# Patient Record
Sex: MALE | Race: WHITE
[De-identification: names, ages, dates, MRNs, and addresses within clinical notes are randomized per-mention and may not be internally consistent; named-entity substitution may affect disease eponyms.]

---

## 2020-07-18 ENCOUNTER — HOSPITAL ENCOUNTER (EMERGENCY)
Dept: HOSPITAL 12 - ER | Age: 65
Discharge: ANOTHER HOSPITAL | End: 2020-07-18
Payer: COMMERCIAL

## 2020-07-18 VITALS — HEIGHT: 65 IN | BODY MASS INDEX: 26.66 KG/M2 | WEIGHT: 160 LBS

## 2020-07-18 DIAGNOSIS — Z82.49: ICD-10-CM

## 2020-07-18 DIAGNOSIS — R00.1: ICD-10-CM

## 2020-07-18 DIAGNOSIS — I10: ICD-10-CM

## 2020-07-18 DIAGNOSIS — Z86.73: ICD-10-CM

## 2020-07-18 DIAGNOSIS — I44.0: ICD-10-CM

## 2020-07-18 DIAGNOSIS — Z79.82: ICD-10-CM

## 2020-07-18 DIAGNOSIS — E11.9: ICD-10-CM

## 2020-07-18 DIAGNOSIS — Z79.84: ICD-10-CM

## 2020-07-18 DIAGNOSIS — J45.909: ICD-10-CM

## 2020-07-18 DIAGNOSIS — R41.82: Primary | ICD-10-CM

## 2020-07-18 LAB
ALP SERPL-CCNC: 51 U/L (ref 50–136)
ALT SERPL W/O P-5'-P-CCNC: 27 U/L (ref 16–63)
AST SERPL-CCNC: 18 U/L (ref 15–37)
BASOPHILS # BLD AUTO: 0.1 K/UL (ref 0–8)
BASOPHILS NFR BLD AUTO: 0.9 % (ref 0–2)
BILIRUB DIRECT SERPL-MCNC: 0.1 MG/DL (ref 0–0.2)
BILIRUB SERPL-MCNC: 0.4 MG/DL (ref 0.2–1)
BUN SERPL-MCNC: 17 MG/DL (ref 7–18)
CHLORIDE SERPL-SCNC: 106 MMOL/L (ref 98–107)
CO2 SERPL-SCNC: 29 MMOL/L (ref 21–32)
CREAT SERPL-MCNC: 1 MG/DL (ref 0.6–1.3)
EOSINOPHIL # BLD AUTO: 0.2 K/UL (ref 0–0.7)
EOSINOPHIL NFR BLD AUTO: 2.3 % (ref 0–7)
GLUCOSE SERPL-MCNC: 141 MG/DL (ref 74–106)
HCT VFR BLD AUTO: 41.4 % (ref 36.7–47.1)
HGB BLD-MCNC: 12.8 G/DL (ref 12.5–16.3)
LYMPHOCYTES # BLD AUTO: 2.9 K/UL (ref 20–40)
LYMPHOCYTES NFR BLD AUTO: 35.7 % (ref 20.5–51.5)
MCH RBC QN AUTO: 22 UUG (ref 23.8–33.4)
MCHC RBC AUTO-ENTMCNC: 31 G/DL (ref 32.5–36.3)
MCV RBC AUTO: 71.1 FL (ref 73–96.2)
MONOCYTES # BLD AUTO: 0.5 K/UL (ref 2–10)
MONOCYTES NFR BLD AUTO: 6.3 % (ref 0–11)
NEUTROPHILS # BLD AUTO: 4.5 K/UL (ref 1.8–8.9)
NEUTROPHILS NFR BLD AUTO: 54.8 % (ref 38.5–71.5)
PLATELET # BLD AUTO: 224 K/UL (ref 152–348)
POTASSIUM SERPL-SCNC: 4.7 MMOL/L (ref 3.5–5.1)
RBC # BLD AUTO: 5.82 MIL/UL (ref 4.06–5.63)
WBC # BLD AUTO: 8.3 K/UL (ref 3.6–10.2)
WS STN SPEC: 6.8 G/DL (ref 6.4–8.2)

## 2020-07-18 PROCEDURE — 99285 EMERGENCY DEPT VISIT HI MDM: CPT

## 2020-07-18 PROCEDURE — 80048 BASIC METABOLIC PNL TOTAL CA: CPT

## 2020-07-18 PROCEDURE — 80076 HEPATIC FUNCTION PANEL: CPT

## 2020-07-18 PROCEDURE — 70450 CT HEAD/BRAIN W/O DYE: CPT

## 2020-07-18 PROCEDURE — 85025 COMPLETE CBC W/AUTO DIFF WBC: CPT

## 2020-07-18 PROCEDURE — 83735 ASSAY OF MAGNESIUM: CPT

## 2020-07-18 PROCEDURE — A4663 DIALYSIS BLOOD PRESSURE CUFF: HCPCS

## 2020-07-18 PROCEDURE — 96361 HYDRATE IV INFUSION ADD-ON: CPT

## 2020-07-18 PROCEDURE — 83605 ASSAY OF LACTIC ACID: CPT

## 2020-07-18 PROCEDURE — 93005 ELECTROCARDIOGRAM TRACING: CPT

## 2020-07-18 PROCEDURE — 83880 ASSAY OF NATRIURETIC PEPTIDE: CPT

## 2020-07-18 PROCEDURE — 71045 X-RAY EXAM CHEST 1 VIEW: CPT

## 2020-07-18 PROCEDURE — 84484 ASSAY OF TROPONIN QUANT: CPT

## 2020-07-18 PROCEDURE — 36415 COLL VENOUS BLD VENIPUNCTURE: CPT

## 2020-07-18 PROCEDURE — 96374 THER/PROPH/DIAG INJ IV PUSH: CPT

## 2020-07-18 PROCEDURE — 87426 SARSCOV CORONAVIRUS AG IA: CPT

## 2020-07-18 RX ADMIN — SODIUM CHLORIDE ONE ML: 0.9 INJECTION, SOLUTION INTRAVENOUS at 10:15

## 2020-07-18 RX ADMIN — SODIUM CHLORIDE ONE MG: 9 INJECTION, SOLUTION INTRAVENOUS at 11:32

## 2020-07-18 RX ADMIN — ASPIRIN ONE MG: 81 TABLET, CHEWABLE ORAL at 11:32

## 2020-07-18 RX ADMIN — METFORMIN HYDROCHLORIDE ONE MG: 500 TABLET ORAL at 16:24

## 2020-07-18 NOTE — NUR
PT WAS TRANSFERED TO [facility] VIA ALS ANBULANCE. REPORT WAS GIVEN TO 
AMBULANCE RN.

## 2020-07-18 NOTE — NUR
PT IS GOING TO BE TRANSFERED VIA ALS AMBULANCE TO [facility], ROOM #523A. 
ADMITTING MD IS DR MAXWELL. REPORT WAS GIVEN TO NELSON RUVALCABA (). ALS 
AMBULANCE MURALI IS 1730. PT IS RESTING COMFOTRTABLY IN BED, NO S/S OF ACUTE 
DISTRESS. CONTINUE TO MONITOR THE PT.

## 2020-12-11 ENCOUNTER — HOSPITAL ENCOUNTER (INPATIENT)
Dept: HOSPITAL 12 - ER | Age: 65
LOS: 1 days | Discharge: HOME | DRG: 312 | End: 2020-12-12
Payer: COMMERCIAL

## 2020-12-11 VITALS — BODY MASS INDEX: 25.43 KG/M2 | WEIGHT: 162 LBS | HEIGHT: 67 IN

## 2020-12-11 VITALS — DIASTOLIC BLOOD PRESSURE: 74 MMHG | SYSTOLIC BLOOD PRESSURE: 164 MMHG

## 2020-12-11 DIAGNOSIS — I10: ICD-10-CM

## 2020-12-11 DIAGNOSIS — J45.909: ICD-10-CM

## 2020-12-11 DIAGNOSIS — E86.0: ICD-10-CM

## 2020-12-11 DIAGNOSIS — E11.9: ICD-10-CM

## 2020-12-11 DIAGNOSIS — N40.0: ICD-10-CM

## 2020-12-11 DIAGNOSIS — E78.5: ICD-10-CM

## 2020-12-11 DIAGNOSIS — Z86.73: ICD-10-CM

## 2020-12-11 DIAGNOSIS — Z79.84: ICD-10-CM

## 2020-12-11 DIAGNOSIS — R55: Primary | ICD-10-CM

## 2020-12-11 LAB
ALP SERPL-CCNC: 40 U/L (ref 50–136)
ALT SERPL W/O P-5'-P-CCNC: 20 U/L (ref 16–63)
AST SERPL-CCNC: 17 U/L (ref 15–37)
BASOPHILS # BLD AUTO: 0.1 K/UL (ref 0–8)
BASOPHILS NFR BLD AUTO: 0.8 % (ref 0–2)
BILIRUB DIRECT SERPL-MCNC: 0.1 MG/DL (ref 0–0.2)
BILIRUB SERPL-MCNC: 0.5 MG/DL (ref 0.2–1)
BUN SERPL-MCNC: 17 MG/DL (ref 7–18)
CHLORIDE SERPL-SCNC: 103 MMOL/L (ref 98–107)
CO2 SERPL-SCNC: 27 MMOL/L (ref 21–32)
CREAT SERPL-MCNC: 0.9 MG/DL (ref 0.6–1.3)
EOSINOPHIL # BLD AUTO: 0.3 K/UL (ref 0–0.7)
EOSINOPHIL NFR BLD AUTO: 2.8 % (ref 0–7)
GLUCOSE SERPL-MCNC: 140 MG/DL (ref 74–106)
HCT VFR BLD AUTO: 43.3 % (ref 36.7–47.1)
HGB BLD-MCNC: 13.7 G/DL (ref 12.5–16.3)
LYMPHOCYTES # BLD AUTO: 3.8 K/UL (ref 20–40)
LYMPHOCYTES NFR BLD AUTO: 40.8 % (ref 20.5–51.5)
MCH RBC QN AUTO: 22.5 UUG (ref 23.8–33.4)
MCHC RBC AUTO-ENTMCNC: 32 G/DL (ref 32.5–36.3)
MCV RBC AUTO: 71.3 FL (ref 73–96.2)
MONOCYTES # BLD AUTO: 0.6 K/UL (ref 2–10)
MONOCYTES NFR BLD AUTO: 6.6 % (ref 0–11)
NEUTROPHILS # BLD AUTO: 4.6 K/UL (ref 1.8–8.9)
NEUTROPHILS NFR BLD AUTO: 49 % (ref 38.5–71.5)
PLATELET # BLD AUTO: 220 K/UL (ref 152–348)
POTASSIUM SERPL-SCNC: 3.8 MMOL/L (ref 3.5–5.1)
RBC # BLD AUTO: 6.07 MIL/UL (ref 4.06–5.63)
WBC # BLD AUTO: 9.3 K/UL (ref 3.6–10.2)
WS STN SPEC: 7.1 G/DL (ref 6.4–8.2)

## 2020-12-11 PROCEDURE — A4663 DIALYSIS BLOOD PRESSURE CUFF: HCPCS

## 2020-12-11 PROCEDURE — G0378 HOSPITAL OBSERVATION PER HR: HCPCS

## 2020-12-11 RX ADMIN — LOSARTAN POTASSIUM SCH MG: 50 TABLET, FILM COATED ORAL at 17:00

## 2020-12-11 RX ADMIN — HYDROCHLOROTHIAZIDE SCH MG: 25 TABLET ORAL at 15:15

## 2020-12-11 RX ADMIN — METFORMIN HYDROCHLORIDE SCH MG: 500 TABLET ORAL at 17:00

## 2020-12-11 RX ADMIN — ASPIRIN SCH MG: 81 TABLET, CHEWABLE ORAL at 15:15

## 2020-12-11 NOTE — NUR
Patient to bathroom independently.  Denies any acute distress.  Ambulatory and 
stable.  Vital signs within acceptable limits.  130/70-75 regular-18-96% in 
room air.

## 2020-12-11 NOTE — NUR
Patient BIB RA from home for a near syncope/syncopal event. Patient states that 
he ambulated to the bathroom approx 30 min PTA and felt as if he was going to 
"pass out" when urinating. Patient A/OX4. Speech is clear, and speaks in 
complete sentences. No acute neuro deficits noted. Denies any cp, palpitations, 
n/v/d. Denies any KO, or any trauma.

## 2020-12-11 NOTE — NUR
Liliana  called to provide authorization for admission # 
[id number] (Raissa Powers).  Patient resting comfortably.  Medications 
given as ordered.

## 2020-12-11 NOTE — NUR
Per Dr. Kwon pt is for tele admission, spoke with tele floor and nursing 
supervisor, there are no beds available and pt will be held in ER until further 
notice.

## 2020-12-11 NOTE — NUR
Pt is awake, alert and oriented. Pt is eating breakfast. NAD noted at this 
time, pending tele admission.

## 2020-12-11 NOTE — NUR
Received patient from ER via wheelchair. Awake, alert and oriented x 4, verbally responsive. 
Able to make needs known. Ambulatory, HL on RAC intact and patent. Denies any 
pain/discomforts at this time. Made comfortable in bed. Routine admission care done. Plan of 
care initiated. Safety measures and fall prevention initiated. VS taken and recorded.

## 2020-12-11 NOTE — NUR
Patient remained in room.  Health plan stated that they will transfer since 
earlier.  Awaiting placement; and admitting discussed the bed issue with health 
plan and requested immediate transfer.  Patient denies any pain or discomfort.

## 2020-12-12 VITALS — DIASTOLIC BLOOD PRESSURE: 85 MMHG | SYSTOLIC BLOOD PRESSURE: 159 MMHG

## 2020-12-12 VITALS — DIASTOLIC BLOOD PRESSURE: 78 MMHG | SYSTOLIC BLOOD PRESSURE: 131 MMHG

## 2020-12-12 VITALS — DIASTOLIC BLOOD PRESSURE: 89 MMHG | SYSTOLIC BLOOD PRESSURE: 160 MMHG

## 2020-12-12 VITALS — SYSTOLIC BLOOD PRESSURE: 126 MMHG | DIASTOLIC BLOOD PRESSURE: 78 MMHG

## 2020-12-12 VITALS — SYSTOLIC BLOOD PRESSURE: 148 MMHG | DIASTOLIC BLOOD PRESSURE: 74 MMHG

## 2020-12-12 VITALS — SYSTOLIC BLOOD PRESSURE: 145 MMHG | DIASTOLIC BLOOD PRESSURE: 63 MMHG

## 2020-12-12 VITALS — DIASTOLIC BLOOD PRESSURE: 79 MMHG | SYSTOLIC BLOOD PRESSURE: 147 MMHG

## 2020-12-12 VITALS — SYSTOLIC BLOOD PRESSURE: 120 MMHG | DIASTOLIC BLOOD PRESSURE: 71 MMHG

## 2020-12-12 LAB
BASOPHILS # BLD AUTO: 0.1 K/UL (ref 0–8)
BASOPHILS NFR BLD AUTO: 1.1 % (ref 0–2)
BUN SERPL-MCNC: 13 MG/DL (ref 7–18)
CHLORIDE SERPL-SCNC: 103 MMOL/L (ref 98–107)
CHOLEST SERPL-MCNC: 126 MG/DL (ref ?–200)
CO2 SERPL-SCNC: 27 MMOL/L (ref 21–32)
CREAT SERPL-MCNC: 0.9 MG/DL (ref 0.6–1.3)
EOSINOPHIL # BLD AUTO: 0.3 K/UL (ref 0–0.7)
EOSINOPHIL NFR BLD AUTO: 2.8 % (ref 0–7)
GLUCOSE SERPL-MCNC: 114 MG/DL (ref 74–106)
HCT VFR BLD AUTO: 43.3 % (ref 36.7–47.1)
HDLC SERPL-MCNC: 47 MG/DL (ref 40–60)
HGB BLD-MCNC: 13.8 G/DL (ref 12.5–16.3)
LYMPHOCYTES # BLD AUTO: 3.4 K/UL (ref 20–40)
LYMPHOCYTES NFR BLD AUTO: 38.2 % (ref 20.5–51.5)
MAGNESIUM SERPL-MCNC: 1.8 MG/DL (ref 1.8–2.4)
MCH RBC QN AUTO: 22.9 UUG (ref 23.8–33.4)
MCHC RBC AUTO-ENTMCNC: 32 G/DL (ref 32.5–36.3)
MCV RBC AUTO: 71.8 FL (ref 73–96.2)
MONOCYTES # BLD AUTO: 0.8 K/UL (ref 2–10)
MONOCYTES NFR BLD AUTO: 8.8 % (ref 0–11)
NEUTROPHILS # BLD AUTO: 4.4 K/UL (ref 1.8–8.9)
NEUTROPHILS NFR BLD AUTO: 49.1 % (ref 38.5–71.5)
PHOSPHATE SERPL-MCNC: 4.1 MG/DL (ref 2.5–4.9)
PLATELET # BLD AUTO: 197 K/UL (ref 152–348)
POTASSIUM SERPL-SCNC: 3.7 MMOL/L (ref 3.5–5.1)
RBC # BLD AUTO: 6.03 MIL/UL (ref 4.06–5.63)
TRIGL SERPL-MCNC: 88 MG/DL (ref 30–150)
WBC # BLD AUTO: 8.9 K/UL (ref 3.6–10.2)

## 2020-12-12 RX ADMIN — METFORMIN HYDROCHLORIDE SCH MG: 500 TABLET ORAL at 09:06

## 2020-12-12 RX ADMIN — METFORMIN HYDROCHLORIDE SCH MG: 500 TABLET ORAL at 18:42

## 2020-12-12 RX ADMIN — LOSARTAN POTASSIUM SCH MG: 50 TABLET, FILM COATED ORAL at 09:06

## 2020-12-12 RX ADMIN — HYDROCHLOROTHIAZIDE SCH MG: 25 TABLET ORAL at 09:07

## 2020-12-12 RX ADMIN — ASPIRIN SCH MG: 81 TABLET, CHEWABLE ORAL at 09:05

## 2020-12-12 NOTE — NUR
Shift End Report: No significant event reported all night. Continue current  plan of care. 
Vs stable.

## 2023-04-13 ENCOUNTER — HOSPITAL ENCOUNTER (EMERGENCY)
Dept: HOSPITAL 54 - ER | Age: 68
Discharge: HOME | End: 2023-04-13
Payer: COMMERCIAL

## 2023-04-13 VITALS — BODY MASS INDEX: 27.64 KG/M2 | HEIGHT: 66 IN | WEIGHT: 172 LBS

## 2023-04-13 VITALS — DIASTOLIC BLOOD PRESSURE: 66 MMHG | SYSTOLIC BLOOD PRESSURE: 121 MMHG

## 2023-04-13 DIAGNOSIS — E11.9: ICD-10-CM

## 2023-04-13 DIAGNOSIS — I10: ICD-10-CM

## 2023-04-13 DIAGNOSIS — J18.9: Primary | ICD-10-CM

## 2023-04-13 DIAGNOSIS — R55: ICD-10-CM

## 2023-04-13 DIAGNOSIS — Z88.0: ICD-10-CM

## 2023-04-13 DIAGNOSIS — J43.9: ICD-10-CM

## 2023-04-13 LAB
ALBUMIN SERPL BCP-MCNC: 3.6 G/DL (ref 3.4–5)
ALP SERPL-CCNC: 50 U/L (ref 46–116)
ALT SERPL W P-5'-P-CCNC: 27 U/L (ref 12–78)
AST SERPL W P-5'-P-CCNC: 22 U/L (ref 15–37)
BASOPHILS # BLD AUTO: 0.1 K/UL (ref 0–0.2)
BASOPHILS NFR BLD AUTO: 0.7 % (ref 0–2)
BILIRUB DIRECT SERPL-MCNC: 0.1 MG/DL (ref 0–0.2)
BILIRUB SERPL-MCNC: 0.6 MG/DL (ref 0.2–1)
BUN SERPL-MCNC: 19 MG/DL (ref 7–18)
CALCIUM SERPL-MCNC: 8.5 MG/DL (ref 8.5–10.1)
CHLORIDE SERPL-SCNC: 104 MMOL/L (ref 98–107)
CO2 SERPL-SCNC: 28 MMOL/L (ref 21–32)
CREAT SERPL-MCNC: 0.8 MG/DL (ref 0.6–1.3)
EOSINOPHIL NFR BLD AUTO: 0.7 % (ref 0–6)
GLUCOSE SERPL-MCNC: 133 MG/DL (ref 74–106)
HCT VFR BLD AUTO: 41 % (ref 39–51)
HGB BLD-MCNC: 12.5 G/DL (ref 13.5–17.5)
LYMPHOCYTES NFR BLD AUTO: 1.4 K/UL (ref 0.8–4.8)
LYMPHOCYTES NFR BLD AUTO: 9.6 % (ref 20–44)
MCHC RBC AUTO-ENTMCNC: 31 G/DL (ref 31–36)
MCV RBC AUTO: 70 FL (ref 80–96)
MONOCYTES NFR BLD AUTO: 0.7 K/UL (ref 0.1–1.3)
MONOCYTES NFR BLD AUTO: 4.7 % (ref 2–12)
NEUTROPHILS # BLD AUTO: 12 K/UL (ref 1.8–8.9)
NEUTROPHILS NFR BLD AUTO: 84.3 % (ref 43–81)
PLATELET # BLD AUTO: 148 K/UL (ref 150–450)
POTASSIUM SERPL-SCNC: 4.7 MMOL/L (ref 3.5–5.1)
PROT SERPL-MCNC: 7.3 G/DL (ref 6.4–8.2)
RBC # BLD AUTO: 5.8 MIL/UL (ref 4.5–6)
SODIUM SERPL-SCNC: 137 MMOL/L (ref 136–145)
WBC NRBC COR # BLD AUTO: 14.2 K/UL (ref 4.3–11)

## 2023-04-13 NOTE — NUR
[id number] home for weakness, nausea since 10am. syncope noted by paramedics bg 
132, given 500ml iv ns, zofran 4mg ivp pta.